# Patient Record
Sex: MALE | Race: WHITE | Employment: OTHER | URBAN - METROPOLITAN AREA
[De-identification: names, ages, dates, MRNs, and addresses within clinical notes are randomized per-mention and may not be internally consistent; named-entity substitution may affect disease eponyms.]

---

## 2021-09-14 ENCOUNTER — HOSPITAL ENCOUNTER (OUTPATIENT)
Dept: MRI IMAGING | Age: 71
Discharge: HOME | End: 2021-09-16
Payer: MEDICARE

## 2021-09-14 DIAGNOSIS — R29.898 RIGHT LEG WEAKNESS: ICD-10-CM

## 2021-09-14 DIAGNOSIS — R29.898 TRANSIENT WEAKNESS OF RIGHT LOWER EXTREMITY: ICD-10-CM

## 2021-09-14 PROCEDURE — 72148 MRI LUMBAR SPINE W/O DYE: CPT

## 2021-09-14 PROCEDURE — 73721 MRI JNT OF LWR EXTRE W/O DYE: CPT

## 2021-11-16 ENCOUNTER — OFFICE VISIT (OUTPATIENT)
Dept: NEUROSURGERY | Age: 71
End: 2021-11-16
Payer: MEDICARE

## 2021-11-16 VITALS
TEMPERATURE: 97.2 F | DIASTOLIC BLOOD PRESSURE: 59 MMHG | WEIGHT: 235 LBS | BODY MASS INDEX: 34.8 KG/M2 | HEIGHT: 69 IN | HEART RATE: 42 BPM | OXYGEN SATURATION: 99 % | SYSTOLIC BLOOD PRESSURE: 125 MMHG

## 2021-11-16 DIAGNOSIS — G89.29 CHRONIC RIGHT-SIDED LOW BACK PAIN WITHOUT SCIATICA: ICD-10-CM

## 2021-11-16 DIAGNOSIS — M54.50 CHRONIC RIGHT-SIDED LOW BACK PAIN WITHOUT SCIATICA: ICD-10-CM

## 2021-11-16 DIAGNOSIS — M54.12 CERVICAL RADICULOPATHY: ICD-10-CM

## 2021-11-16 DIAGNOSIS — R29.898 WEAKNESS OF RIGHT LOWER EXTREMITY: ICD-10-CM

## 2021-11-16 DIAGNOSIS — M54.2 NECK PAIN: Primary | ICD-10-CM

## 2021-11-16 PROCEDURE — 99203 OFFICE O/P NEW LOW 30 MIN: CPT | Performed by: STUDENT IN AN ORGANIZED HEALTH CARE EDUCATION/TRAINING PROGRAM

## 2021-11-16 ASSESSMENT — ENCOUNTER SYMPTOMS
PHOTOPHOBIA: 0
SHORTNESS OF BREATH: 0
BACK PAIN: 0
TROUBLE SWALLOWING: 0
ABDOMINAL PAIN: 0

## 2021-11-16 NOTE — PROGRESS NOTES
Subjective:      Patient ID: Margaret Lake is a 66 y.o. male who presents with neck pain. Patient states this neck pain has been going on for years but has gradually gotten worse. Patient describes this pain as an aching pain that radiates down the left arm and is not starting to intermittently go down the right arm. Patient states he has associated numbness in in the left arm as well as weakness. He states he is no longer able to open a container. He denies any alleviating or aggravating factors. Has not tried PT or BERTHA for this pain. Patient is a current smoker, smoking 1ppd and is on ASA and Eliquis for a stent in his heart. On further questioning patient also admits to some weakness in his right leg that caused him to fall a few times. MRI lumbar spine reviewed. Patient states weakness it has progressively gotten better but him and his wife were inquiring about PT. Review of Systems   Constitutional: Negative for fever. HENT: Negative for trouble swallowing. Eyes: Negative for photophobia. Respiratory: Negative for shortness of breath. Cardiovascular: Negative for chest pain. Gastrointestinal: Negative for abdominal pain. Endocrine: Negative for heat intolerance. Genitourinary: Negative for flank pain. Musculoskeletal: Positive for neck pain. Negative for back pain and myalgias. Skin: Negative for wound. Neurological: Positive for weakness and numbness. Negative for headaches. Psychiatric/Behavioral: Negative for confusion. Objective:   Physical Exam  HENT:      Head: Normocephalic and atraumatic. Eyes:      Pupils: Pupils are equal, round, and reactive to light. Cardiovascular:      Rate and Rhythm: Normal rate. Pulmonary:      Effort: Pulmonary effort is normal.   Abdominal:      General: There is no distension. Musculoskeletal:         General: Normal range of motion. Cervical back: Normal range of motion. Skin:     General: Skin is warm and dry. Neurological:      Mental Status: He is alert. Comments: A&Ox3  CN3-12 intact  4/5 right HF otherwise Motor Strength full   Sensation intact to light touch except in left upper extremity which patient states is numb compared to right upper extremity  (-) Bilateral Palomino  (-) Bilateral Straight Leg test   Psychiatric:         Thought Content: Thought content normal.         Assessment:      -Shailesh Elder is a 67 y/o male who presents with neck pain and associated left radiculopathy. Patient has not tried any PT or BERTHA for this pain. Plan:      -Pain control and expectations discussed  -MRI cervical spine  -PT referral   -OARRS reviewed  -Please call with any questions or concerns.          Juan M Lorenzo PA-C

## 2021-11-22 ENCOUNTER — HOSPITAL ENCOUNTER (OUTPATIENT)
Dept: MRI IMAGING | Age: 71
Discharge: HOME | End: 2021-11-24
Payer: MEDICARE

## 2021-11-22 DIAGNOSIS — M54.12 CERVICAL RADICULOPATHY: ICD-10-CM

## 2021-11-22 DIAGNOSIS — M54.2 NECK PAIN: ICD-10-CM

## 2021-11-22 PROCEDURE — 72141 MRI NECK SPINE W/O DYE: CPT

## 2021-12-01 ENCOUNTER — EVALUATION (OUTPATIENT)
Dept: PHYSICAL THERAPY | Age: 71
End: 2021-12-01
Payer: MEDICARE

## 2021-12-01 DIAGNOSIS — R29.898 WEAKNESS OF RIGHT LOWER EXTREMITY: Primary | ICD-10-CM

## 2021-12-01 DIAGNOSIS — M54.50 CHRONIC RIGHT-SIDED LOW BACK PAIN WITHOUT SCIATICA: ICD-10-CM

## 2021-12-01 DIAGNOSIS — G89.29 CHRONIC RIGHT-SIDED LOW BACK PAIN WITHOUT SCIATICA: ICD-10-CM

## 2021-12-01 DIAGNOSIS — M54.12 CERVICAL RADICULOPATHY: ICD-10-CM

## 2021-12-01 PROCEDURE — 97162 PT EVAL MOD COMPLEX 30 MIN: CPT | Performed by: PHYSICAL THERAPIST

## 2021-12-01 NOTE — PROGRESS NOTES
Physical Therapy Daily Treatment Note    Date: 2021  Patient Name: Mainor Faria  : 1950   MRN: [id number]  DOInjury: 1-2 years   DOSx: None  Referring Provider: Migel Samuel, [address] [place] [address],[address] [facility],  [address] [place] [address]     Medical Diagnosis:      Diagnosis Orders   1. Weakness of right lower extremity     2. Cervical radiculopathy     3. Chronic right-sided low back pain without sciatica         Outcome Measure:  Lower Extremity Functional Scale (LEFS) 75% impairment on eval               Quick Dash 64% impairment on eval    S: See eval  O: Mod edema  Time  6378-4834       Visit  1 Repeat outcome measure at mid point and end.     Pain    5/10 R knee     ROM  See eval     Modalities       Ice, compression, elevation           Stretching       Patella mobs      Prone hangs      Heel props      Prone self flexion stretch      Knee flexion stretch on step      Knee flexion stretch supine hip and knee at 90°            Knee flexion stretch on leg press machine      Knee flexion stretch on leg extension machine      Knee extension stretch on leg curl machine            Exercise       Nustep  NEXT     Heel slides NEXT     Quad sets NEXT     SLR      HS stretch NEXT     Knee flex stretch-seated      SAQ NEXT     LAQ      HS in sitting with pillow case            Marching       Standing Hip Abduction      Standing Hip Flexion      Standing Hip Extension      Standing HS Curl      Toe Raises      Squats       Sit to [employer]            Wall Squats      Split Squats      [place] Split Squats      Lunges      Eccentric Heel Tap      Terminal Knee Extension with Grey Band      SLS            Functional Activity       Step-ups - FWD  NEXT     Step-ups - LAT NEXT     Step-ups - BWD  NEXT     Step up and over reciprocally       TG squats NEXT     TG Calf raises NEXT           Weighted Machines      Leg Press      Toe Raises      Leg Curl       Knee Ext       NMR For safe ambulation in community and home    Marching gait Side stepping      Retrowalk      Lunges      Lateral Lunges      Heel to toe      A: Tolerated well. Above added to written HEP along with instructions for ice and elevation.   P: Continue with rehab plan  Montana Ruff PT    Treatment Charges: Mins Units   Initial Evaluation 40 1   Re-Evaluation     Ther Exercise         TE     Manual Therapy     MT     Ther Activities        TA     Gait Training          GT     Neuro Re-education NR     Modalities     Non-Billable Service Time     Other     Total Time/Units 40 1

## 2021-12-01 NOTE — PROGRESS NOTES
WNL° Flexion,  WNL° Extension    Neck:  WNL except for B side bending which is limited by 15 degrees. L shoulder: WNL     Strength:    Knee:   Right: Flexion 2/5,  Extension 2/5  Left: Flexion 3+/5,  Extension 3+/5    Neck: 4/5  L shoulder: 3/5    Palpation: Not tender    ASSESSMENT     Pt to benefit from skilled PT services in order to improve ROM, strength, functional mobility, endurance, and decrease pain in R knee and L shoulder. Pt is most limited by R knee/leg weakness and stated that his knee has given out leading to falls but that he has not had a fall since using the rollator. Pt is very stiff and weak in his R hip, knee, and ankle. Pt to perform exercises to strengthen R knee/leg as well as work on gait training and balance training to reduce risk for fall and maximize function. Do to overall health concerns and deconditioning, pt is not a candidate for cervical traction. Outcome Measure:   Lower Extremity Functional Scale (LEFS) 75% impairment      Quick Dash 64% impaired    Problems:   Pain 5/10 intermittent  ROM decreased  Strength decreased   Balance decreased in both standing and walking   Limitations with walking, ADLs , use of right lower extremity, bending, lifting, carrying, driving      [x] There are no barriers affecting plan of care or recovery    [] Barriers to this patient's plan of care or recovery include:    Domestic Concerns:  [x] No  [] Yes:    Short Term goals (2-3 weeks)   Pain 3/10   ROM WFL   Strength 3-/5 R knee, 3+/5 L shoulder   Able to perform / complete the following functions / tasks: ADLs, hobbies, yard work, reach / lift / carry light weighted items in performance of home or work demands, tolerate weightbearing activities for 30 minutes - 1 hour, return to exercise regimen  with less pain.     Lower Extremity Functional Scale (LEFS) 60% impairment   Quick Dash 55% impairment    Long Term goals (4-6 weeks)   Pain 0-3/10   ROM WNL   Strength 3+/5 R knee, 4-/5 L shoulder   Able to perform / complete the following functions / tasks: ADLs, normal gait, normal stair negotiation , hobbies, yard work, reach / lift / carry medium weighted items in performance of home or work demands, tolerate weightbearing activities for 2 - 4 hours, return to exercise regimen  with no/minimal pain.  LEFS 0-45% impairment   Quick Dash 0-40% impairment   Independent with home exercise program (HEP)    Rehab Potential: [x] Good  [] Fair  [] Poor    PLAN       Treatment Plan:  instruction in home exercise program   therapeutic exercise   therapeutic activity   neuromuscular re-education   gait training   balance training   manual therapy   cold / hot pack  electrical stimulation     The following CPT codes are likely to be used in the care of this patient:   58174 PT Evaluation: Moderate Complexity   23340 PT Re-Evaluation   08613 Therapeutic Exercise   63941 Neuromuscular Re-Education   11631 Therapeutic Activities   60720 Manual Therapy   36617 Gait Training   50598 Vasopneumatic Device    Electrical Stimulation    Suggested Professional Referral: [x] No  [] Yes:     Patient Education:  [x] Plans / Goals, Risks / Benefits discussed  [x] Home exercise program  Method of Education: [x] Verbal  [x] Demo  [x] Written  Comprehension of Education:  [x] Verbalizes understanding. [x] Demonstrates understanding. [] Needs Review. [] Demonstrates / verbalizes understanding of HEP / Anali Glassing previously given. Frequency:  2-3 days per week for 6-8 weeks    Patient understands diagnosis/prognosis and consents to treatment, plan and goals: [x] Yes    [] No     Thank you for the opportunity to work with your patient. If you have questions or comments, please contact me at [phone]; fax: [phone]. Electronically signed by: Tres Louie PT         Please sign Physician's Certification and return to:   [address] [place] [address] PHYSICAL THERAPY  [address] [facility] [place] [address]  Dept: [phone]  Dept Fax: [phone] Certification / Comments     Frequency/Duration 2-3 days per week for 6-8 weeks. Certification period from 12/1/2021  to 03/01/2022. I have reviewed the Plan of Care established for skilled therapy services and certify that the services are required and that they will be provided while the patient is under my care.     Physician's Comments/Revisions:               Physician's Printed Name:                                           [de-identified] Signature:                                                               Date:

## 2021-12-01 NOTE — PROGRESS NOTES
[facility] OUTPATIENT REHABILITATION  PHYSICAL THERAPY INITIAL EVALUATION         Date:  2021   Patient: Elisa Jackson  : 1950  MRN: [id number]  Referring Provider: Veda Graves, [address] [facility],[address] [facility],  [address] [place] [address]     Medical Diagnosis:      Diagnosis Orders   1. Chronic right-sided low back pain without sciatica     2. Weakness of right lower extremity         Physician Order: Eval and Treat ***    SUBJECTIVE:     Onset date: ***    Onset: {onset:26807}     History / Mechanism of Injury: ***    Interventions for current problem: {medmanagement:36903}    Chief complaint: {chiefcomplaint:92021}    Behavior: condition is {condition:60873}    Pain: {pain:88946}  Current: {pain scale:131298381}/10     Best: {pain scale:936799952}/10     Worst:{pain scale:484232408}/10 (occurs with ***). Pain returns to baseline in {tissueirritability:61470}    Symptom Type / Quality: {Pain Descriptors:01933}  Location[de-identified] Back: {notedabove:56007}{LOCATION:4464044932} {RADIATES:6738361895}    LB/LE Ratio: ***       Provoking Activities/Positions: {backaggravators:49691}                 Relieving Activitie/Positions: {backrelievers:08022}    Disturbed Sleep: {sleep:80543}  Bladder Dysfunction: {bladderbowel:62592}  Bowel Dysfunction: {bladderbowel:87503}     Imaging results: MRI CERVICAL SPINE WO CONTRAST    Result Date: 2021  EXAMINATION: MRI OF THE CERVICAL SPINE WITHOUT CONTRAST 2021 5:12 pm TECHNIQUE: Multiplanar multisequence MRI of the cervical spine was performed without the administration of intravenous contrast. COMPARISON: None. HISTORY: ORDERING SYSTEM PROVIDED HISTORY: Neck pain FINDINGS: No fracture or malalignment. Moderate disc space narrowing at C6/C7. Anterior osteophytosis at C5/C6, C6/C7, and C7/T1. No prevertebral soft tissue edema. No evidence of epidural mass or hematoma. C2/C3: No central canal stenosis or neural foraminal narrowing.  C3/C4: Mild broad-based central disc herniation. No cord effacement. Moderate bilateral neural foraminal narrowing more significant on the right. C4/C5: Broad-based central disc herniation results in mild effacement of the cervical cord. AP dimension of thecal sac measures 5 mm. Moderate to severe bilateral neural foraminal narrowing. C5/C6: Broad-based right paracentral disc herniation results in mild effacement of right paracentral aspect of the cord. Moderate to severe right neural foraminal narrowing. Moderate to severe left neural foraminal narrowing. C6/C7: Broad-based central disc herniation results in effacement of the cord and central canal stenosis. AP dimension of thecal sac measures 5 mm. Severe bilateral neural foraminal narrowing. Increased T2 and FLAIR signal located in the cervical spinal cord at this level. C7/T1: No central canal stenosis. Moderate to severe left neural foraminal narrowing. 1. Cervical spondylosis as described above at multiple levels notable at C6/C7 where there is disc herniation with cord effacement as well as abnormal signal within the cord suggesting myelomalacia. 2. No fracture or malalignment. Past Medical History:  No past medical history on file. No past surgical history on file. Medications:   No current outpatient medications on file. No current facility-administered medications for this visit. Occupation: {occupation:55561}. Physical demands include: {physicaldemands:33342}. Status: {workstatus:10134}    Exercise regimen: {exerciseregimen:88627}    Hobbies: {hobbies:60035}    Patient Goals: {patientgoals:52687}    Contraindications/Precautions: {precautions:43096}    OBJECTIVE:     Estimated body mass index is 34.7 kg/m² as calculated from the following:    Height as of 11/16/21: 5' 9\" (1.753 m). Weight as of 11/16/21: 235 lb (106.6 kg).      Observations: {observations:32620}     Inspection: {skeletalinspection:12369}         Gait: Date:

## 2021-12-01 NOTE — PROGRESS NOTES
Physical Therapy Treatment Note    Date: 2021  Patient Name: Del Dodge  : 1950   MRN: [id number]  [facility] ***  DOSx: ***  Referring Provider: Theo Garcia, [address] [facility],6[address] Floor [facility],  [facility]     Medical Diagnosis:    Diagnosis Orders   1. Chronic right-sided low back pain without sciatica     2. Weakness of right lower extremity         Outcome Measure:  Modified Oswestry ***% disability    S: see eval  O:  Time ***     Visit *** Repeat outcome measure at mid point and end.     Pain {NUMBERS 0-10:}/10     ROM      Modalities      MH + ES      Traction            Exercise      ALL EXERCISE DONE WITH DRAW-IN TECHNIQUE       Manual     Mobilization/Manipulation              THEREX     Supine Knee to Chest ***     LTR  ***     Supine Clams ***     Pelvic Tilts ***     Bridges ***     Supine HS Stretch  ***     Piriformis Stretch      Trunk Flexion      Trunk Extension      Quadratus Lumborum Stretch on Wall            Cat/Camel      Bird Dog      Prone Lumbar Extension      Prone Arm and Leg Raises      Prone Multifidus Strengthening       Crunches      Plank      Lateral Plank       Functional Activities To aid in reaching , pushing, pulling tasks at home     Nustep        ROWS: H      ROWS: M      ROWS: L      Punches      Triceps Ext Standing      Trunk Ext TB      Trunk Flex TB      Obliques - low      Obliques - high      Core Builder            SLR      Supine Abduction      Supine Marches      SL Clamshells      SL Abduction      Prone Hip Extension            Standing Marching      Standing Hip Abduction      Standing Hip Extension      Standing Hip Flexion       Squats      Stairs - FWD      Stairs - LAT            TG Squats      Leg Press       Gait     Marching Gait      Side Stepping             Weighted Activities      Lat Pull Down      Vertical Row      ROWS: H      ROWS: M      ROWS: L      Dead Lift      Squats      Bent over Row      [address] Dead Lift              A:  Tolerated well. Above added to written HEP.   P: Continue with rehab plan  Riaz Horan PT    Treatment Charges: Mins Units   Initial Evaluation *** ***   Re-Evaluation     Ther Exercise         TE *** ***   Manual Therapy     MT     Ther Activities        TA     Gait Training          GT     Neuro Re-education NR     Modalities     Non-Billable Service Time     Other     Total Time/Units *** ***

## 2021-12-07 ENCOUNTER — TREATMENT (OUTPATIENT)
Dept: PHYSICAL THERAPY | Age: 71
End: 2021-12-07
Payer: MEDICARE

## 2021-12-07 DIAGNOSIS — M54.12 CERVICAL RADICULOPATHY: ICD-10-CM

## 2021-12-07 DIAGNOSIS — M54.50 CHRONIC RIGHT-SIDED LOW BACK PAIN WITHOUT SCIATICA: ICD-10-CM

## 2021-12-07 DIAGNOSIS — R29.898 WEAKNESS OF RIGHT LOWER EXTREMITY: Primary | ICD-10-CM

## 2021-12-07 DIAGNOSIS — G89.29 CHRONIC RIGHT-SIDED LOW BACK PAIN WITHOUT SCIATICA: ICD-10-CM

## 2021-12-07 PROCEDURE — 97110 THERAPEUTIC EXERCISES: CPT

## 2021-12-07 NOTE — PROGRESS NOTES
Physical Therapy Daily Treatment Note    Date: 2021  Patient Name: Florina Cordova  : 1950   MRN: [id number]  DOInjury: 1-2 years   DOSx: None  Referring Provider:  Darlene Montoya, [address] [place] [address],[address] Floor [facility],  [address] [place] [address]       Medical Diagnosis:      Diagnosis Orders   1. Weakness of right lower extremity     2. Cervical radiculopathy     3. Chronic right-sided low back pain without sciatica         Outcome Measure:  Lower Extremity Functional Scale (LEFS) 75% impairment on eval               Quick Dash 64% impairment on eval    S: Pt reports 5/10 right knee pain. States he has difficult time controlling R LE.  O: Mod edema  Time  1024-6105      Visit  2 Repeat outcome measure at mid point and end.     Pain    5/10 R knee     ROM  See eval     Modalities       Ice, compression, elevation           Stretching       Patella mobs      Prone hangs      Heel props      Prone self flexion stretch      Knee flexion stretch on step      Knee flexion stretch supine hip and knee at 90°            Knee flexion stretch on leg press machine      Knee flexion stretch on leg extension machine      Knee extension stretch on leg curl machine            Exercise       Nustep  L5 x 7 min     Heel slides 2 x 10     Quad sets 1 x 10 hold 10s     SLR      HS stretch NEXT     Knee flex stretch-seated      SAQ 2 x 10     LAQ 2 x 10     HS in sitting with pillow case            Marching       Standing Hip Abduction      Standing Hip Flexion      Standing Hip Extension      Standing HS Curl      Toe Raises      Squats       Sit to [employer]            Wall Squats      Split Squats      [place] Split Squats      Lunges      Eccentric Heel Tap      Terminal Knee Extension with Grey Band      SLS            Functional Activity       Step-ups - FWD  NEXT     Step-ups - LAT NEXT     Step-ups - BWD  NEXT     Step up and over reciprocally       TG squats NEXT     TG Calf raises NEXT           Weighted Machines      Leg Press      Toe Raises      Leg Curl       Knee Ext       NMR For safe ambulation in community and home    Marching gait      Side stepping      Retrowalk      Lunges      Lateral Lunges      Heel to toe      A: Tolerated fair. Pt fatigued with exercise. Pt 's exhibits moderate right side LE weakness. Pt lacking control of R LE requiring assistance to maintain a neutral position with hip wanting to externally rotate.    P: Continue with rehab plan  Bharath Farooq PTA    Treatment Charges: Mins Units   Initial Evaluation     Re-Evaluation     Ther Exercise         TE 40 3   Manual Therapy     MT     Ther Activities        TA     Gait Training          GT     Neuro Re-education NR     Modalities     Non-Billable Service Time     Other     Total Time/Units 40 3

## 2021-12-09 ENCOUNTER — TREATMENT (OUTPATIENT)
Dept: PHYSICAL THERAPY | Age: 71
End: 2021-12-09
Payer: MEDICARE

## 2021-12-09 DIAGNOSIS — R29.898 WEAKNESS OF RIGHT LOWER EXTREMITY: Primary | ICD-10-CM

## 2021-12-09 DIAGNOSIS — M54.50 CHRONIC RIGHT-SIDED LOW BACK PAIN WITHOUT SCIATICA: ICD-10-CM

## 2021-12-09 DIAGNOSIS — M54.12 CERVICAL RADICULOPATHY: ICD-10-CM

## 2021-12-09 DIAGNOSIS — G89.29 CHRONIC RIGHT-SIDED LOW BACK PAIN WITHOUT SCIATICA: ICD-10-CM

## 2021-12-09 PROCEDURE — 97110 THERAPEUTIC EXERCISES: CPT

## 2021-12-09 NOTE — PROGRESS NOTES
Physical Therapy Daily Treatment Note    Date: 2021  Patient Name: Zaida Rene  : 1950   MRN: [id number]  DOInjury: 1-2 years   DOSx: None  Referring Provider:  Nidhi Nelson, [address] [place] [address],[address] [facility],  [address] [place] [address] Rd       Medical Diagnosis:      Diagnosis Orders   1. Weakness of right lower extremity     2. Cervical radiculopathy     3. Chronic right-sided low back pain without sciatica         Outcome Measure:  Lower Extremity Functional Scale (LEFS) 75% impairment on eval               Quick Dash 64% impairment on eval    S: Pt reports 54/10 right knee pain. States he was sore following last session but better today  O: Mod edema  Time  2727-4658      Visit  3 Repeat outcome measure at mid point and end.     Pain    4/10 R knee     ROM  See eval     Modalities       Ice, compression, elevation           Stretching       Patella mobs      Prone hangs      Heel props      Prone self flexion stretch      Knee flexion stretch on step      Knee flexion stretch supine hip and knee at 90°            Knee flexion stretch on leg press machine      Knee flexion stretch on leg extension machine      Knee extension stretch on leg curl machine            Exercise       Nustep  L5 x 7 min     Heel slides 3 x 10     Quad sets 2 x 5 hold 10s     SLR      Supine hip Abd 2 x 10     HS stretch      Knee flex stretch-seated      SAQ 3 x 10     LAQ 3 x 10     HS in sitting with pillow case            Marching       Standing Hip Abduction      Standing Hip Flexion      Standing Hip Extension      Standing HS Curl      Toe Raises      Squats       Sit to [employer]            Wall Squats      Split Squats      [place] Split Squats      Lunges      Eccentric Heel Tap      Terminal Knee Extension with Grey Band      SLS            Functional Activity       Step-ups - FWD      Step-ups - LAT     Step-ups - BWD      Step up and over reciprocally      TG squats     TG Calf raises           Weighted Machines      Leg Press Toe Raises      Leg Curl       Knee Ext       NMR For safe ambulation in community and home    Marching gait      Side stepping      Retrowalk      Lunges      Lateral Lunges      Heel to toe      A: Tolerated fair. Pt fatigued with exercise. Pt 's exhibits moderate right side LE weakness.  Pt lacking control of R LE requiring both verbal and tactile cues to prevent hip from externally rotating   P: Continue with rehab plan  Merlyn Mccatry PTA    Treatment Charges: Mins Units   Initial Evaluation     Re-Evaluation     Ther Exercise         TE 40 3   Manual Therapy     MT     Ther Activities        TA     Gait Training          GT     Neuro Re-education NR     Modalities     Non-Billable Service Time     Other     Total Time/Units 40 3

## 2021-12-15 ENCOUNTER — TREATMENT (OUTPATIENT)
Dept: PHYSICAL THERAPY | Age: 71
End: 2021-12-15
Payer: MEDICARE

## 2021-12-15 DIAGNOSIS — G89.29 CHRONIC RIGHT-SIDED LOW BACK PAIN WITHOUT SCIATICA: Primary | ICD-10-CM

## 2021-12-15 DIAGNOSIS — M54.50 CHRONIC RIGHT-SIDED LOW BACK PAIN WITHOUT SCIATICA: Primary | ICD-10-CM

## 2021-12-15 DIAGNOSIS — R29.898 WEAKNESS OF RIGHT LOWER EXTREMITY: ICD-10-CM

## 2021-12-15 DIAGNOSIS — M54.12 CERVICAL RADICULOPATHY: ICD-10-CM

## 2021-12-15 PROCEDURE — 97110 THERAPEUTIC EXERCISES: CPT

## 2021-12-15 NOTE — PROGRESS NOTES
Physical Therapy Daily Treatment Note    Date: 12/15/2021  Patient Name: Mila Siegel  : 1950   MRN: [id number]  [facility]: 1-2 [facility]   DOSx: None  Referring Provider:  Romana Jurist, [address] [place] [address],[address] [facility],  [address] [place] [address]       Medical Diagnosis:      Diagnosis Orders   1. Chronic right-sided low back pain without sciatica     2. Weakness of right lower extremity     3. Cervical radiculopathy         Outcome Measure:  Lower Extremity Functional Scale (LEFS) 75% impairment on eval               Quick Dash 64% impairment on eval    S: Pt reports 4/10 right knee pain. States R LE gives out periodically, states he does not trust RLE to hold his weight. O: Mod edema  Time  1300-      Visit  4 Repeat outcome measure at mid point and end.     Pain    4/10 R knee     ROM  See eval     Modalities       Ice, compression, elevation           Stretching       Patella mobs      Prone hangs      Heel props      Prone self flexion stretch      Knee flexion stretch on step      Knee flexion stretch supine hip and knee at 90°            Knee flexion stretch on leg press machine      Knee flexion stretch on leg extension machine      Knee extension stretch on leg curl machine            Exercise       Nustep  L5 x 10 min     Heel slides X 20 AAROM     Quad sets 2 x 5 hold 10s     SLR      Supine hip Abd X 10 AAROM     HS stretch      Knee flex stretch-seated      SAQ 2 x 10-13reps seated unable to achieve full ext, fatigues    LAQ 2 x 6-7 Unable to achieve full ext, fatigues    HS in sitting with pillow case            Marching  X 20 ea LE In II bars, 2-hand hold    Standing Hip Abduction  x 20 ea LE In II bars, 2-hand hold    Standing Hip Flexion      Standing Hip Extension      Standing HS Curl X 20 ea LE In II bars, 2-hand hold    Toe Raises      Squats       Sit to [employer] Squats      Split Squats      [facility] Split Squats      Lung      Eccentric Heel Tap      Terminal Knee Extension with [facility] Band      SLS            Functional Activity       Step-ups - FWD      Step-ups - LAT     Step-ups - BWD      Step up and over reciprocally      TG squats     TG Calf raises           Weighted Machines      Leg Press      Toe Raises      Leg Curl       Knee Ext       NMR For safe ambulation in community and home    Marching gait      Side stepping      Retrowalk      Lunges      Lateral Lunges      Heel to toe      A: Tolerated fair. Pt fatigued with exercise. Pt 's exhibits moderate right side LE weakness.  Pt lacking control of R LE requiring both verbal and tactile cues to prevent hip from externally rotating   P: Continue with rehab plan  Ebony Griffin PTA    Treatment Charges: Mins Units   Initial Evaluation     Re-Evaluation     Ther Exercise         TE 40 3   Manual Therapy     MT     Ther Activities        TA     Gait Training          GT     Neuro Re-education NR     Modalities     Non-Billable Service Time     Other     Total Time/Units 40 3

## 2021-12-17 ENCOUNTER — TREATMENT (OUTPATIENT)
Dept: PHYSICAL THERAPY | Age: 71
End: 2021-12-17
Payer: MEDICARE

## 2021-12-17 DIAGNOSIS — M54.12 CERVICAL RADICULOPATHY: ICD-10-CM

## 2021-12-17 DIAGNOSIS — M54.50 CHRONIC RIGHT-SIDED LOW BACK PAIN WITHOUT SCIATICA: Primary | ICD-10-CM

## 2021-12-17 DIAGNOSIS — R29.898 WEAKNESS OF RIGHT LOWER EXTREMITY: ICD-10-CM

## 2021-12-17 DIAGNOSIS — G89.29 CHRONIC RIGHT-SIDED LOW BACK PAIN WITHOUT SCIATICA: Primary | ICD-10-CM

## 2021-12-17 PROCEDURE — 97110 THERAPEUTIC EXERCISES: CPT

## 2021-12-17 NOTE — PROGRESS NOTES
Physical Therapy Daily Treatment Note    Date: 2021  Patient Name: Ivon Rich  : 1950   MRN: [id number]  [facility]   DOSx: None    Referring Provider:    Kurtis Corral, [address] [facility],[address] [facility], [address] [place] [address]         Medical Diagnosis:    Diagnosis Orders   1. Chronic right-sided low back pain without sciatica     2. Weakness of right lower extremity     3. Cervical radiculopathy         Outcome Measure:  Lower Extremity Functional Scale (LEFS) 75% impairment on eval               Quick Dash 64% impairment on eval    S: Pt reports continued right knee pain. Patient struggles to find a comfortable spot for knee when laying in bed. O: Mod edema  Time  1911-7922      Visit  5 Repeat outcome measure at mid point and end.     Pain    4/10 R knee     ROM  AROM: 90° Flexion,  -10° Extension  PROM: 135° Flexion,  -5° Extension     Modalities       Ice, compression, elevation           Stretching       Patella mobs      Prone hangs      Heel props 4 min  TE   Prone self flexion stretch      Knee flexion stretch on step      Knee flexion stretch supine hip and knee at 90°            Knee flexion stretch on leg press machine      Knee flexion stretch on leg extension machine      Knee extension stretch on leg curl machine            Exercise       Nustep  L5 x 10 min  TE   Heel slides X 20 AAROM  TE   Quad sets 5 second hold 20 reps  TE   SLR      Supine hip Abd X 10 AAROM  TE   HS stretch      Knee flex stretch-seated      SAQ 2 x 10-13 reps seated unable to achieve full ext, fatigues TE   LAQ 2 x 10 Difficult to achieve full ext TE   HS in sitting with pillow case            Marching  X 20 ea LE In II bars, 2-hand hold TE   Standing Hip Abduction  x 20 ea LE In II bars, 2-hand hold TE   Standing Hip Flexion      Standing Hip Extension      Standing HS Curl X 20 ea LE In II bars, 2-hand hold TE   Toe Raises      Squats       Sit to [facility] Split Squats      Lunges      Eccentric Heel Tap      Terminal Knee Extension with [employer] Band      SLS            Functional Activity       Step-ups - FWD      Step-ups - LAT     Step-ups - BWD      Step up and over reciprocally      TG squats     TG Calf raises           Weighted Machines      Leg Press      Toe Raises      Leg Curl       Knee Ext       NMR For safe ambulation in community and home    Marching gait      Side stepping      Retrowalk      Lunges      Lateral Lunges      Heel to toe      A: Tolerated fair. Pt fatigued with exercise. Pt 's exhibits moderate right side LE weakness and unsteadiness where he is at risk of falling. Encouraged to always use rollator and to continue HEP to improve stability and strength.  Pt lacking control of R LE requiring both verbal and tactile cues to prevent hip from externally rotating   P: Continue with rehab plan  Uma Wells PTA    Treatment Charges: Mins Units   Initial Evaluation     Re-Evaluation     Ther Exercise         TE 40 3   Manual Therapy     MT     Ther Activities        TA     Gait Training          GT     Neuro Re-education NR     Modalities     Non-Billable Service Time     Other     Total Time/Units 40 3

## 2023-02-22 ENCOUNTER — HOSPITAL ENCOUNTER (OUTPATIENT)
Dept: CT IMAGING | Age: 73
Discharge: HOME | End: 2023-02-22
Payer: OTHER GOVERNMENT

## 2023-02-22 DIAGNOSIS — J44.9 OBSTRUCTIVE CHRONIC BRONCHITIS WITHOUT EXACERBATION (HCC): ICD-10-CM

## 2023-02-22 PROCEDURE — 71250 CT THORAX DX C-: CPT

## 2025-06-21 ENCOUNTER — HOSPITAL ENCOUNTER (EMERGENCY)
Age: 75
Discharge: HOME | End: 2025-06-22
Attending: STUDENT IN AN ORGANIZED HEALTH CARE EDUCATION/TRAINING PROGRAM
Payer: OTHER GOVERNMENT

## 2025-06-21 ENCOUNTER — APPOINTMENT (OUTPATIENT)
Dept: CT IMAGING | Age: 75
End: 2025-06-21
Payer: OTHER GOVERNMENT

## 2025-06-21 VITALS
RESPIRATION RATE: 18 BRPM | OXYGEN SATURATION: 95 % | SYSTOLIC BLOOD PRESSURE: 112 MMHG | DIASTOLIC BLOOD PRESSURE: 67 MMHG | TEMPERATURE: 98 F | HEIGHT: 68 IN | HEART RATE: 50 BPM | WEIGHT: 235 LBS | BODY MASS INDEX: 35.61 KG/M2

## 2025-06-21 DIAGNOSIS — S39.012A STRAIN OF LUMBAR REGION, INITIAL ENCOUNTER: Primary | ICD-10-CM

## 2025-06-21 LAB
BILIRUB UR QL STRIP: NEGATIVE
CLARITY UR: CLEAR
COLOR UR: YELLOW
GLUCOSE UR STRIP-MCNC: NEGATIVE MG/DL
HGB UR QL STRIP.AUTO: NEGATIVE
KETONES UR STRIP-MCNC: NEGATIVE MG/DL
LEUKOCYTE ESTERASE UR QL STRIP: NEGATIVE
NITRITE UR QL STRIP: NEGATIVE
PH UR STRIP: 6 [PH] (ref 5–8)
PROT UR STRIP-MCNC: NEGATIVE MG/DL
RBC #/AREA URNS HPF: ABNORMAL /HPF
SP GR UR STRIP: <1.005 (ref 1–1.03)
UROBILINOGEN UR STRIP-ACNC: 0.2 EU/DL (ref 0–1)
WBC #/AREA URNS HPF: ABNORMAL /HPF

## 2025-06-21 PROCEDURE — 81001 URINALYSIS AUTO W/SCOPE: CPT

## 2025-06-21 PROCEDURE — 72131 CT LUMBAR SPINE W/O DYE: CPT

## 2025-06-21 PROCEDURE — 6360000002 HC RX W HCPCS

## 2025-06-21 PROCEDURE — 99284 EMERGENCY DEPT VISIT MOD MDM: CPT

## 2025-06-21 PROCEDURE — 96372 THER/PROPH/DIAG INJ SC/IM: CPT

## 2025-06-21 RX ORDER — ORPHENADRINE CITRATE 30 MG/ML
60 INJECTION INTRAMUSCULAR; INTRAVENOUS ONCE
Status: COMPLETED | OUTPATIENT
Start: 2025-06-21 | End: 2025-06-21

## 2025-06-21 RX ORDER — CYCLOBENZAPRINE HCL 5 MG
5 TABLET ORAL 2 TIMES DAILY PRN
Qty: 10 TABLET | Refills: 0 | Status: SHIPPED | OUTPATIENT
Start: 2025-06-21 | End: 2025-06-26

## 2025-06-21 RX ORDER — KETOROLAC TROMETHAMINE 30 MG/ML
30 INJECTION, SOLUTION INTRAMUSCULAR; INTRAVENOUS ONCE
Status: COMPLETED | OUTPATIENT
Start: 2025-06-21 | End: 2025-06-21

## 2025-06-21 RX ADMIN — ORPHENADRINE CITRATE 60 MG: 30 INJECTION, SOLUTION INTRAMUSCULAR; INTRAVENOUS at 21:53

## 2025-06-21 RX ADMIN — KETOROLAC TROMETHAMINE 30 MG: 30 INJECTION, SOLUTION INTRAMUSCULAR at 21:51

## 2025-06-21 ASSESSMENT — PAIN SCALES - GENERAL
PAINLEVEL_OUTOF10: 6
PAINLEVEL_OUTOF10: 2
PAINLEVEL_OUTOF10: 2

## 2025-06-21 ASSESSMENT — LIFESTYLE VARIABLES
HOW MANY STANDARD DRINKS CONTAINING ALCOHOL DO YOU HAVE ON A TYPICAL DAY: PATIENT DOES NOT DRINK
HOW OFTEN DO YOU HAVE A DRINK CONTAINING ALCOHOL: NEVER

## 2025-06-21 ASSESSMENT — PAIN DESCRIPTION - FREQUENCY: FREQUENCY: CONTINUOUS

## 2025-06-21 ASSESSMENT — PAIN DESCRIPTION - ONSET: ONSET: SUDDEN

## 2025-06-21 ASSESSMENT — PAIN DESCRIPTION - ORIENTATION: ORIENTATION: LOWER

## 2025-06-21 ASSESSMENT — PAIN DESCRIPTION - LOCATION
LOCATION: BACK
LOCATION: BACK

## 2025-06-21 ASSESSMENT — PAIN DESCRIPTION - PAIN TYPE: TYPE: ACUTE PAIN

## 2025-06-21 ASSESSMENT — PAIN DESCRIPTION - DESCRIPTORS
DESCRIPTORS: ACHING
DESCRIPTORS: SHOOTING

## 2025-06-21 ASSESSMENT — PAIN - FUNCTIONAL ASSESSMENT: PAIN_FUNCTIONAL_ASSESSMENT: 0-10

## 2025-06-22 NOTE — ED PROVIDER NOTES
IMPRESSION      1. Strain of lumbar region, initial encounter          DISPOSITION/PLAN     DISPOSITION Decision To Discharge 06/21/2025 11:31:30 PM   DISPOSITION CONDITION Stable           [facility]  [address] [facility] [address] [phone]  Schedule an appointment as soon as possible for a visit   for follow-up appointment      DISCHARGE MEDICATIONS:  New Prescriptions    CYCLOBENZAPRINE (FLEXERIL) 5 MG TABLET    Take 1 tablet by mouth 2 times daily as needed for Muscle spasms       DISCONTINUED MEDICATIONS:  Discontinued Medications    No medications on file              (Please note that portions of this note were completed with a voice recognition program.  Efforts were made to edit the dictations but occasionally words are mis-transcribed.)    Jorge Lucero, DO PGY-2

## 2025-06-22 NOTE — DISCHARGE INSTRUCTIONS
Follow with your primary doctor by calling the number listed above if your symptoms do not improve in the next week  You may use the muscle relaxer for pain relief, do not operate a vehicle or heavy machinery within 4 hours of taking this medication